# Patient Record
Sex: MALE | Race: WHITE | Employment: FULL TIME | URBAN - METROPOLITAN AREA
[De-identification: names, ages, dates, MRNs, and addresses within clinical notes are randomized per-mention and may not be internally consistent; named-entity substitution may affect disease eponyms.]

---

## 2021-02-11 ENCOUNTER — TELEPHONE (OUTPATIENT)
Dept: ADMINISTRATIVE | Age: 36
End: 2021-02-11

## 2021-02-11 NOTE — TELEPHONE ENCOUNTER
I will take the patient but I do not want to delay his first visit. Have him see Inna Musa and then I will take over the care. I just 1 to make sure with this weight loss that he see somebody as quickly as possible.

## 2021-02-11 NOTE — TELEPHONE ENCOUNTER
Pt informed. Pt was offered an appt with other providers.   He stated he has another name provided by Carlos Enrique Jenkins and will try that first.

## 2021-02-11 NOTE — TELEPHONE ENCOUNTER
Pt called requesting new pt appt stating Dr. Renay Carrero is referring him to you for 40 lb unintended weight loss over the past year, testicular pain, low lobido. Pt states Dr. Renay Carrero does not believe he has prostate cancer at this time but needs a PCP for additional work up. Please verify if you can accept this pt.

## 2021-02-11 NOTE — TELEPHONE ENCOUNTER
Pt called requesting a new pt appt and states Dr. Vaughn Arteaga is referring him to you. Over the past year, pt had (unexplained) 40lb weight loss, testicular pain, and low libido. There was concern about prostate cancer, but states Dr. Vaughn Arteaga does not believe that is the case right now. Pt was advised to schedule with primary care physician for additional care and follow up testing. Please advise if pt may schedule.     Thank you

## 2021-02-12 ENCOUNTER — OFFICE VISIT (OUTPATIENT)
Dept: PRIMARY CARE CLINIC | Age: 36
End: 2021-02-12
Payer: COMMERCIAL

## 2021-02-12 VITALS
HEART RATE: 82 BPM | OXYGEN SATURATION: 97 % | TEMPERATURE: 97.5 F | BODY MASS INDEX: 29.93 KG/M2 | WEIGHT: 221 LBS | HEIGHT: 72 IN | SYSTOLIC BLOOD PRESSURE: 118 MMHG | DIASTOLIC BLOOD PRESSURE: 72 MMHG | RESPIRATION RATE: 18 BRPM

## 2021-02-12 DIAGNOSIS — R35.89 OTHER POLYURIA: ICD-10-CM

## 2021-02-12 DIAGNOSIS — R63.4 WEIGHT LOSS: ICD-10-CM

## 2021-02-12 DIAGNOSIS — R73.09 ELEVATED GLUCOSE: ICD-10-CM

## 2021-02-12 DIAGNOSIS — E11.9 TYPE 2 DIABETES MELLITUS WITHOUT COMPLICATION, WITHOUT LONG-TERM CURRENT USE OF INSULIN (HCC): ICD-10-CM

## 2021-02-12 DIAGNOSIS — E11.9 TYPE 2 DIABETES MELLITUS WITHOUT COMPLICATION, WITHOUT LONG-TERM CURRENT USE OF INSULIN (HCC): Primary | ICD-10-CM

## 2021-02-12 LAB
ALBUMIN SERPL-MCNC: 4.9 G/DL (ref 3.5–5.2)
ALP BLD-CCNC: 92 U/L (ref 40–129)
ALT SERPL-CCNC: 38 U/L (ref 0–40)
ANION GAP SERPL CALCULATED.3IONS-SCNC: 15 MMOL/L (ref 7–16)
AST SERPL-CCNC: 20 U/L (ref 0–39)
BASOPHILS ABSOLUTE: 0.04 E9/L (ref 0–0.2)
BASOPHILS RELATIVE PERCENT: 0.5 % (ref 0–2)
BILIRUB SERPL-MCNC: 0.5 MG/DL (ref 0–1.2)
BILIRUBIN, POC: NORMAL
BLOOD URINE, POC: NORMAL
BUN BLDV-MCNC: 18 MG/DL (ref 6–20)
CALCIUM SERPL-MCNC: 10 MG/DL (ref 8.6–10.2)
CHLORIDE BLD-SCNC: 101 MMOL/L (ref 98–107)
CHOLESTEROL, TOTAL: 144 MG/DL (ref 0–199)
CHP ED QC CHECK: NORMAL
CLARITY, POC: CLEAR
CO2: 25 MMOL/L (ref 22–29)
COLOR, POC: YELLOW
CREAT SERPL-MCNC: 0.9 MG/DL (ref 0.7–1.2)
CREATININE URINE: 151 MG/DL (ref 40–278)
EOSINOPHILS ABSOLUTE: 0.18 E9/L (ref 0.05–0.5)
EOSINOPHILS RELATIVE PERCENT: 2.4 % (ref 0–6)
GFR AFRICAN AMERICAN: >60
GFR NON-AFRICAN AMERICAN: >60 ML/MIN/1.73
GLUCOSE BLD-MCNC: 197 MG/DL
GLUCOSE BLD-MCNC: 253 MG/DL (ref 74–99)
GLUCOSE URINE, POC: 500
HCT VFR BLD CALC: 51.8 % (ref 37–54)
HDLC SERPL-MCNC: 33 MG/DL
HEMOGLOBIN: 17.4 G/DL (ref 12.5–16.5)
IMMATURE GRANULOCYTES #: 0.02 E9/L
IMMATURE GRANULOCYTES %: 0.3 % (ref 0–5)
KETONES, POC: 15
LDL CHOLESTEROL CALCULATED: 66 MG/DL (ref 0–99)
LEUKOCYTE EST, POC: NORMAL
LYMPHOCYTES ABSOLUTE: 2.16 E9/L (ref 1.5–4)
LYMPHOCYTES RELATIVE PERCENT: 28.2 % (ref 20–42)
MCH RBC QN AUTO: 27.5 PG (ref 26–35)
MCHC RBC AUTO-ENTMCNC: 33.6 % (ref 32–34.5)
MCV RBC AUTO: 82 FL (ref 80–99.9)
MICROALBUMIN UR-MCNC: 84.9 MG/L
MICROALBUMIN/CREAT UR-RTO: 56.2 (ref 0–30)
MONOCYTES ABSOLUTE: 0.54 E9/L (ref 0.1–0.95)
MONOCYTES RELATIVE PERCENT: 7.1 % (ref 2–12)
NEUTROPHILS ABSOLUTE: 4.71 E9/L (ref 1.8–7.3)
NEUTROPHILS RELATIVE PERCENT: 61.5 % (ref 43–80)
NITRITE, POC: NORMAL
PDW BLD-RTO: 12.3 FL (ref 11.5–15)
PH, POC: 5
PLATELET # BLD: 189 E9/L (ref 130–450)
PMV BLD AUTO: 11.8 FL (ref 7–12)
POTASSIUM SERPL-SCNC: 4.1 MMOL/L (ref 3.5–5)
PROTEIN, POC: 30
RBC # BLD: 6.32 E12/L (ref 3.8–5.8)
SODIUM BLD-SCNC: 141 MMOL/L (ref 132–146)
SPECIFIC GRAVITY, POC: >=1.03
TOTAL PROTEIN: 7.8 G/DL (ref 6.4–8.3)
TRIGL SERPL-MCNC: 226 MG/DL (ref 0–149)
UROBILINOGEN, POC: 0.2
VLDLC SERPL CALC-MCNC: 45 MG/DL
WBC # BLD: 7.7 E9/L (ref 4.5–11.5)

## 2021-02-12 PROCEDURE — 81002 URINALYSIS NONAUTO W/O SCOPE: CPT | Performed by: NURSE PRACTITIONER

## 2021-02-12 PROCEDURE — 99204 OFFICE O/P NEW MOD 45 MIN: CPT | Performed by: NURSE PRACTITIONER

## 2021-02-12 PROCEDURE — 82962 GLUCOSE BLOOD TEST: CPT | Performed by: NURSE PRACTITIONER

## 2021-02-12 RX ORDER — GLUCOSAMINE HCL/CHONDROITIN SU 500-400 MG
CAPSULE ORAL
Qty: 100 STRIP | Refills: 2 | Status: SHIPPED
Start: 2021-02-12 | End: 2021-03-09

## 2021-02-12 RX ORDER — BLOOD-GLUCOSE METER
1 KIT MISCELLANEOUS DAILY
Qty: 1 KIT | Refills: 0 | Status: SHIPPED
Start: 2021-02-12 | End: 2021-03-09

## 2021-02-12 SDOH — ECONOMIC STABILITY: FOOD INSECURITY: WITHIN THE PAST 12 MONTHS, THE FOOD YOU BOUGHT JUST DIDN'T LAST AND YOU DIDN'T HAVE MONEY TO GET MORE.: NEVER TRUE

## 2021-02-12 SDOH — ECONOMIC STABILITY: TRANSPORTATION INSECURITY
IN THE PAST 12 MONTHS, HAS THE LACK OF TRANSPORTATION KEPT YOU FROM MEDICAL APPOINTMENTS OR FROM GETTING MEDICATIONS?: NOT ASKED

## 2021-02-12 SDOH — HEALTH STABILITY: MENTAL HEALTH: HOW MANY STANDARD DRINKS CONTAINING ALCOHOL DO YOU HAVE ON A TYPICAL DAY?: NOT ASKED

## 2021-02-12 SDOH — ECONOMIC STABILITY: INCOME INSECURITY: HOW HARD IS IT FOR YOU TO PAY FOR THE VERY BASICS LIKE FOOD, HOUSING, MEDICAL CARE, AND HEATING?: NOT HARD AT ALL

## 2021-02-12 SDOH — HEALTH STABILITY: MENTAL HEALTH: HOW OFTEN DO YOU HAVE A DRINK CONTAINING ALCOHOL?: MONTHLY OR LESS

## 2021-02-12 SDOH — ECONOMIC STABILITY: TRANSPORTATION INSECURITY
IN THE PAST 12 MONTHS, HAS LACK OF TRANSPORTATION KEPT YOU FROM MEETINGS, WORK, OR FROM GETTING THINGS NEEDED FOR DAILY LIVING?: NOT ASKED

## 2021-02-12 SDOH — ECONOMIC STABILITY: FOOD INSECURITY: WITHIN THE PAST 12 MONTHS, YOU WORRIED THAT YOUR FOOD WOULD RUN OUT BEFORE YOU GOT MONEY TO BUY MORE.: NEVER TRUE

## 2021-02-12 ASSESSMENT — PATIENT HEALTH QUESTIONNAIRE - PHQ9
1. LITTLE INTEREST OR PLEASURE IN DOING THINGS: 0
SUM OF ALL RESPONSES TO PHQ QUESTIONS 1-9: 0

## 2021-02-12 ASSESSMENT — ENCOUNTER SYMPTOMS
BACK PAIN: 1
DIARRHEA: 0
ALLERGIC/IMMUNOLOGIC NEGATIVE: 1
EYES NEGATIVE: 1
RESPIRATORY NEGATIVE: 1

## 2021-02-12 NOTE — TELEPHONE ENCOUNTER
Pt is scheduled for this afternoon at 2:30. I will call the office again to let them know I added him to Amanda's schedule.

## 2021-02-12 NOTE — PROGRESS NOTES
Subjective  CC: Patient presents establish. Previously saw Dr. Xin Burk in [facility], about 2 years ago. HPI: The patient is here to establish. For the last several months, he has been having urinary urgency, incomplete bladder emptying, dribbling, nocturia 4-5 times per night, some pressure when urinating, and loss of libido. Because of these issues, he saw Dr. Winter Kemp yesterday. The patient states that he did have an in office urine, and is going to have a testicular ultrasound as well as cystoscopy. During the course of their visit, the patient mentioned that he had lost about 40 pounds over the past year without any changes in his diet. The only change that he can verbalizes that his work has changed. 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 We reviewed the patient's past medical, family, and social history. Review of systems completed, which was positive for several signs of hyperglycemia. ROS:  Review of Systems   Constitutional: Positive for fatigue. Negative for unexpected weight change. HENT: Negative. Eyes: Negative. Respiratory: Negative. Cardiovascular: Negative. Gastrointestinal: Negative for diarrhea. IBS diarrhea - typically once every few weeks, most frequent the past 2 weeks   Endocrine: Positive for polydipsia and polyuria. Negative for polyphagia. Genitourinary: Positive for frequency and urgency. Musculoskeletal: Positive for back pain. Skin: Negative. Allergic/Immunologic: Negative. Neurological: Negative. Hematological: Negative. Psychiatric/Behavioral: Negative. Current Outpatient Medications:     blood glucose monitor strips, Test one times a day & as needed for symptoms of irregular blood glucose.  Dispense sufficient amount for indicated testing frequency plus additional to accommodate PRN testing needs. , Disp: 100 strip, Rfl: 2    glucose monitoring kit (FREESTYLE) monitoring kit, 1 kit by Does not apply route daily, Disp: 1 kit, Rfl: 0    metFORMIN (GLUCOPHAGE) 1000 MG tablet, Take 1 tablet by mouth 2 times daily (with meals), Disp: 60 tablet, Rfl: 5   Allergies   Allergen Reactions    Amoxapine And Related     Ceclor [Cefaclor]     Compazine [Prochlorperazine]     Penicillins     Septra [Sulfamethoxazole-Trimethoprim]     Sulfa Antibiotics         PMH:  Past Medical History:   Diagnosis Date    Irritable bowel syndrome with diarrhea         Objective  Vitals:    02/12/21 1429   BP: 118/72   Site: Left Upper Arm   Position: Sitting   Cuff Size: Medium Adult   Pulse: 82   Resp: 18   Temp: 97.5 °F (36.4 °C)   TempSrc: Temporal   SpO2: 97%   Weight: 221 lb (100.2 kg)   Height: 6' (1.829 m)      Exam:  Const: Appears healthy, well developed and well nourished. Appears overweight. Eyes: EOMI in both eyes. PERRL. ENMT: External ears WNL. Tympanic membranes are intact, right TM with some mild scarring. Septum is in the midline. Posterior  pharynx shows no exudate, irritation or redness. Neck: Supple and symmetric. Palpation reveals no adenopathy. No masses appreciated. Thyroid  exhibits no nodule or thyromegaly. No JVD. Resp: Respirations are unlabored. Respiration rate is normal. Auscultate good airflow. No rales,  rhonchi or wheezes appreciated over the lungs bilaterally. Abdomen: BS x 4 quadrants. Abdomen soft, round, nontender. No organomegaly noted. CV: Rhythm is regular. S1 is normal. S2 is normal.  Extremities: No clubbing or cyanosis. Musculo: Walks with a normal gait. Upper Extremities: Full ROM bilaterally. Lower Extremities: Full  ROM bilaterally. Skin: Dry and warm with no rash. Neuro: Alert and oriented x3. Mood is normal. Affect is normal. Speech is articulate and fluent. Wendy Ye was seen today for \[facility]\"" care.     Diagnoses and all orders for this visit:    Type 2 diabetes mellitus without complication, without long-term current use of insulin ([facility])  -     CBC Auto Differential; Future  -     Comprehensive Metabolic Panel; Future  -     Lipid Panel; Future  -     POCT Urinalysis no Micro  -     Microalbumin / Creatinine Urine Ratio; Future  -     blood glucose monitor strips; Test one times a day & as needed for symptoms of irregular blood glucose. Dispense sufficient amount for indicated testing frequency plus additional to accommodate PRN testing needs.  -     glucose monitoring kit (FREESTYLE) monitoring kit; 1 kit by Does not apply route daily  -     metFORMIN (GLUCOPHAGE) 1000 MG tablet; Take 1 tablet by mouth 2 times daily (with meals)    Other polyuria  -     POCT Glucose  -     POCT Urinalysis no Micro    Elevated glucose  -     Hemoglobin A1C; Future  -     Hemoglobin A1C    Weight loss  -     TSH without Reflex; Future       Care Plan:  The patient's point-of-care glucose was 197, hemoglobin A1c today was 11.8. The patient's never previously been diagnosed with diabetes, and is pharmacotherapy-naïve. Complete lab work will be done today, and I discussed with patient and his wife the implications of new diagnosis of diabetes. He denies any significant worsening of symptoms over the last 2 to 3 days. Complications of hyperglycemia were reviewed in detail, instructions for monitoring glucose were reviewed and diet was discussed in detail. The patient does drink a lot of sugary drinks, and diet is high in simple carbohydrates. Extensive time in teaching. The patient will keep a glucose log over the next several weeks, he will be started on Metformin, half a tab daily for 1 week and increase to a whole tab daily. I will see him back in a few weeks, I will need to complete an EKG at that time. I suspect that his symptoms will most likely all be related back to hyperglycemia, but he will continue to follow with urology as planned. To notify the office of any further questions.

## 2021-02-15 LAB — TSH SERPL DL<=0.05 MIU/L-ACNC: 4.74 UIU/ML (ref 0.27–4.2)

## 2021-02-16 DIAGNOSIS — R63.4 WEIGHT LOSS: ICD-10-CM

## 2021-02-16 RX ORDER — METFORMIN HYDROCHLORIDE 500 MG/1
500 TABLET, EXTENDED RELEASE ORAL 2 TIMES DAILY
Qty: 60 TABLET | Refills: 5 | Status: SHIPPED
Start: 2021-02-16 | End: 2021-03-09

## 2021-03-09 ENCOUNTER — TELEPHONE (OUTPATIENT)
Dept: PRIMARY CARE CLINIC | Age: 36
End: 2021-03-09

## 2021-03-09 ENCOUNTER — OFFICE VISIT (OUTPATIENT)
Dept: PRIMARY CARE CLINIC | Age: 36
End: 2021-03-09
Payer: COMMERCIAL

## 2021-03-09 VITALS
RESPIRATION RATE: 18 BRPM | OXYGEN SATURATION: 98 % | HEIGHT: 72 IN | DIASTOLIC BLOOD PRESSURE: 68 MMHG | BODY MASS INDEX: 29.39 KG/M2 | HEART RATE: 74 BPM | WEIGHT: 217 LBS | TEMPERATURE: 98.3 F | SYSTOLIC BLOOD PRESSURE: 108 MMHG

## 2021-03-09 DIAGNOSIS — E11.9 TYPE 2 DIABETES MELLITUS WITHOUT COMPLICATION, WITHOUT LONG-TERM CURRENT USE OF INSULIN (HCC): Primary | ICD-10-CM

## 2021-03-09 DIAGNOSIS — R94.6 ABNORMAL THYROID FUNCTION TEST: ICD-10-CM

## 2021-03-09 DIAGNOSIS — R79.89 ABNORMAL CBC: ICD-10-CM

## 2021-03-09 LAB
BASOPHILS ABSOLUTE: 0.04 E9/L (ref 0–0.2)
BASOPHILS RELATIVE PERCENT: 0.6 % (ref 0–2)
EOSINOPHILS ABSOLUTE: 0.15 E9/L (ref 0.05–0.5)
EOSINOPHILS RELATIVE PERCENT: 2.1 % (ref 0–6)
HCT VFR BLD CALC: 50.2 % (ref 37–54)
HEMOGLOBIN: 16.2 G/DL (ref 12.5–16.5)
IMMATURE GRANULOCYTES #: 0.03 E9/L
IMMATURE GRANULOCYTES %: 0.4 % (ref 0–5)
LYMPHOCYTES ABSOLUTE: 1.89 E9/L (ref 1.5–4)
LYMPHOCYTES RELATIVE PERCENT: 27.1 % (ref 20–42)
MCH RBC QN AUTO: 28 PG (ref 26–35)
MCHC RBC AUTO-ENTMCNC: 32.3 % (ref 32–34.5)
MCV RBC AUTO: 86.9 FL (ref 80–99.9)
MONOCYTES ABSOLUTE: 0.48 E9/L (ref 0.1–0.95)
MONOCYTES RELATIVE PERCENT: 6.9 % (ref 2–12)
NEUTROPHILS ABSOLUTE: 4.39 E9/L (ref 1.8–7.3)
NEUTROPHILS RELATIVE PERCENT: 62.9 % (ref 43–80)
PDW BLD-RTO: 12.8 FL (ref 11.5–15)
PLATELET # BLD: 178 E9/L (ref 130–450)
PMV BLD AUTO: 12.3 FL (ref 7–12)
RBC # BLD: 5.78 E12/L (ref 3.8–5.8)
T4 FREE: 1.24 NG/DL (ref 0.93–1.7)
TSH SERPL DL<=0.05 MIU/L-ACNC: 2.03 UIU/ML (ref 0.27–4.2)
WBC # BLD: 7 E9/L (ref 4.5–11.5)

## 2021-03-09 PROCEDURE — 99213 OFFICE O/P EST LOW 20 MIN: CPT | Performed by: NURSE PRACTITIONER

## 2021-03-09 PROCEDURE — 93000 ELECTROCARDIOGRAM COMPLETE: CPT | Performed by: NURSE PRACTITIONER

## 2021-03-09 RX ORDER — LANCETS 28 GAUGE
EACH MISCELLANEOUS
COMMUNITY
Start: 2021-02-13

## 2021-03-09 RX ORDER — METFORMIN HYDROCHLORIDE 500 MG/1
500 TABLET, EXTENDED RELEASE ORAL DAILY
Qty: 60 TABLET | Refills: 5
Start: 2021-03-09 | End: 2021-08-27 | Stop reason: SDUPTHER

## 2021-03-09 ASSESSMENT — ENCOUNTER SYMPTOMS
RESPIRATORY NEGATIVE: 1
DIARRHEA: 0
EYES NEGATIVE: 1
ALLERGIC/IMMUNOLOGIC NEGATIVE: 1
BACK PAIN: 1

## 2021-03-09 NOTE — PROGRESS NOTES
Subjective  CC: Patient presents to follow-up. HPI: The patient established last month with complaints of weight loss over the previous 6 months. He was found to have type 2 diabetes, hemoglobin A1c was 11.8. The patient was initially started on regular release Metformin but could not tolerate this so he is now taking extended release Metformin once daily with good tolerance. His fasting glucoses are significantly improved over the last month. These were in the 200 range, and he is now fairly consistently between 110 and 130. He has made significant dietary changes. 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 He plans to work on improving this and will let us know if medication may be helpful. I reviewed lab work extensively that was completed at his last office visit, he does have moderate microalbuminuria, this will need to be monitored and perhaps an antihypertensive started for renal protection if it does not improve, but anticipated improving based on the way his fasting glucoses look. His red blood cell count and hemoglobin are also slightly elevated, the patient states he does have a history of sleep apnea but has not needed to use the CPAP for several months since he has lost weight and has not had any snoring or apneic episodes. This will be rechecked along with free T4 and TSH which are slightly elevated. The patient states polyuria and polydipsia have nearly completely resolved. He does continue to follow with urology and is planning to have cystoscopy due to persistent urinary symptoms. ROS:  Review of Systems   Constitutional: Positive for fatigue. Negative for unexpected weight change.    HENT: Negative. Eyes: Negative. Respiratory: Negative. Cardiovascular: Negative. Gastrointestinal: Negative for diarrhea. IBS diarrhea - typically once every few weeks, most frequent the past 2 weeks   Endocrine: Negative for polydipsia, polyphagia and polyuria. Genitourinary: Negative for frequency and urgency. Musculoskeletal: Positive for back pain. Skin: Negative. Allergic/Immunologic: Negative. Neurological: Negative. Hematological: Negative. Psychiatric/Behavioral: Negative. Current Outpatient Medications:     metFORMIN (GLUCOPHAGE-XR) 500 MG extended release tablet, Take 1 tablet by mouth daily, Disp: 60 tablet, Rfl: 5    FreeStyle Lancets MISC, , Disp: , Rfl:    Allergies   Allergen Reactions    Amoxapine And Related     Ceclor [Cefaclor]     Compazine [Prochlorperazine]     Penicillins     Septra [Sulfamethoxazole-Trimethoprim]     Sulfa Antibiotics         PMH:  Past Medical History:   Diagnosis Date    Irritable bowel syndrome with diarrhea     Obstructive sleep apnea         Objective  Vitals:    03/09/21 1003   BP: 108/68   Site: Left Upper Arm   Position: Sitting   Cuff Size: Medium Adult   Pulse: 74   Resp: 18   Temp: 98.3 °F (36.8 °C)   TempSrc: Temporal   SpO2: 98%   Weight: 217 lb (98.4 kg)   Height: 6' (1.829 m)      Exam:  Const: Appears healthy, well developed and well nourished. Appears overweight. Neck: Supple and symmetric. Palpation reveals no adenopathy. No masses appreciated. Thyroid  exhibits no nodule or thyromegaly. No JVD. Resp: Respirations are unlabored. Respiration rate is normal. Auscultate good airflow. No rales,  rhonchi or wheezes appreciated over the lungs bilaterally. CV: Rhythm is regular. S1 is normal. S2 is normal.  Extremities: No clubbing or cyanosis. Musculo: Walks with a normal gait. Upper Extremities: Full ROM bilaterally. Lower Extremities: Full  ROM bilaterally. Skin: Dry and warm with no rash.   Neuro: Alert and oriented x3. Mood is normal. Affect is normal. Speech is articulate and fluent. Umberto Gary was seen today for diabetes and follow-up. Diagnoses and all orders for this visit:    Type 2 diabetes mellitus without complication, without long-term current use of insulin ([facility])  -     EKG 12 Lead    Abnormal thyroid function test  -     TSH without Reflex; Future  -     T4, Free; Future    Abnormal CBC  -     CBC Auto Differential; Future  -     EKG 12 Lead    Other orders  -     metFORMIN (GLUCOPHAGE-XR) 500 MG extended release tablet; Take 1 tablet by mouth daily       Care Plan:  Extensive education on swelling in regards to diabetic management. The patient will continue current therapy, continue to make medication changes, and will be seen back in 2 to 3 months for repeat hemoglobin A1c and a recheck of urine microalbumin. I have asked them to continue to monitor weight loss, would anticipate this to plateau in the near future. Notify us of any problems in the interim.

## 2021-05-13 ENCOUNTER — OFFICE VISIT (OUTPATIENT)
Dept: PRIMARY CARE CLINIC | Age: 36
End: 2021-05-13
Payer: COMMERCIAL

## 2021-05-13 VITALS
SYSTOLIC BLOOD PRESSURE: 112 MMHG | HEIGHT: 72 IN | DIASTOLIC BLOOD PRESSURE: 76 MMHG | BODY MASS INDEX: 29.66 KG/M2 | TEMPERATURE: 97 F | OXYGEN SATURATION: 97 % | WEIGHT: 219 LBS | HEART RATE: 72 BPM

## 2021-05-13 DIAGNOSIS — E11.9 TYPE 2 DIABETES MELLITUS WITHOUT COMPLICATION, WITHOUT LONG-TERM CURRENT USE OF INSULIN (HCC): Primary | ICD-10-CM

## 2021-05-13 DIAGNOSIS — E11.9 TYPE 2 DIABETES MELLITUS WITHOUT COMPLICATION, WITHOUT LONG-TERM CURRENT USE OF INSULIN (HCC): ICD-10-CM

## 2021-05-13 LAB
ANION GAP SERPL CALCULATED.3IONS-SCNC: 14 MMOL/L (ref 7–16)
BUN BLDV-MCNC: 14 MG/DL (ref 6–20)
CALCIUM SERPL-MCNC: 9.5 MG/DL (ref 8.6–10.2)
CHLORIDE BLD-SCNC: 99 MMOL/L (ref 98–107)
CO2: 23 MMOL/L (ref 22–29)
CREAT SERPL-MCNC: 0.9 MG/DL (ref 0.7–1.2)
CREATININE URINE: 117 MG/DL (ref 40–278)
GFR AFRICAN AMERICAN: >60
GFR NON-AFRICAN AMERICAN: >60 ML/MIN/1.73
GLUCOSE BLD-MCNC: 198 MG/DL (ref 74–99)
HBA1C MFR BLD: 7.3 %
MICROALBUMIN UR-MCNC: 15.2 MG/L
MICROALBUMIN/CREAT UR-RTO: 13 (ref 0–30)
POTASSIUM SERPL-SCNC: 4 MMOL/L (ref 3.5–5)
SODIUM BLD-SCNC: 136 MMOL/L (ref 132–146)

## 2021-05-13 PROCEDURE — 83036 HEMOGLOBIN GLYCOSYLATED A1C: CPT | Performed by: INTERNAL MEDICINE

## 2021-05-13 PROCEDURE — 3051F HG A1C>EQUAL 7.0%<8.0%: CPT | Performed by: INTERNAL MEDICINE

## 2021-05-13 PROCEDURE — 99214 OFFICE O/P EST MOD 30 MIN: CPT | Performed by: INTERNAL MEDICINE

## 2021-05-13 ASSESSMENT — ENCOUNTER SYMPTOMS
DIARRHEA: 0
BACK PAIN: 1
ALLERGIC/IMMUNOLOGIC NEGATIVE: 1
EYES NEGATIVE: 1
RESPIRATORY NEGATIVE: 1

## 2021-05-13 NOTE — PROGRESS NOTES
Subjective  CC: Patient presents to follow-up. HPI: This a patient who is newly diagnosed with diabetes. His initial hemoglobin A1c back in February was 11.8. He has been doing very well in terms of dietary restriction and keeping his blood sugars. The patient has not had a dilated eye exam but I told him I would wait at least 6 months while his blood sugars are well controlled before getting an examination. I told him that his lines would be dilating and this will need to be adjusted first in terms of blood sugar control. Patient seems to be doing well with the medication. I believe he had high hopes that I would tell him he could go off the medication but this is not the case. I did explain the process of microalbuminuria. We also discussed macro and microvascular events related to diabetes. ROS:  Review of Systems   Constitutional: Negative for fatigue and unexpected weight change. HENT: Negative. Eyes: Negative. Respiratory: Negative. Cardiovascular: Negative. Gastrointestinal: Negative for diarrhea. IBS diarrhea - typically once every few weeks, most frequent the past 2 weeks   Endocrine: Negative for polydipsia, polyphagia and polyuria. Genitourinary: Negative for frequency and urgency. Musculoskeletal: Positive for back pain. Skin: Negative. Allergic/Immunologic: Negative. Neurological: Negative. Hematological: Negative. Psychiatric/Behavioral: Negative. Negative for self-injury.           Current Outpatient Medications:     FreeStyle Lancets MISC, , Disp: , Rfl:     metFORMIN (GLUCOPHAGE-XR) 500 MG extended release tablet, Take 1 tablet by mouth daily, Disp: 60 tablet, Rfl: 5   Allergies   Allergen Reactions    Amoxapine And Related     Ceclor [Cefaclor]     Compazine [Prochlorperazine]     Penicillins     Septra [Sulfamethoxazole-Trimethoprim]     Sulfa Antibiotics         PMH:  Past Medical History:   Diagnosis Date    Irritable bowel syndrome with diarrhea     Obstructive sleep apnea         Objective  Vitals:    05/13/21 1408   BP: 112/76   Pulse: 72   Temp: 97 °F (36.1 °C)   SpO2: 97%   Weight: 219 lb (99.3 kg)   Height: 6' (1.829 m)      Exam:  Const: Appears healthy, well developed and well nourished. Appears overweight. Neck: Supple and symmetric. Palpation reveals no adenopathy. No masses appreciated. Thyroid  exhibits no nodule or thyromegaly. No JVD. Resp: Respirations are unlabored. Respiration rate is normal. Auscultate good airflow. No rales,  rhonchi or wheezes appreciated over the lungs bilaterally. CV: Rhythm is regular. S1 is normal. S2 is normal.  Extremities: No clubbing or cyanosis. Musculo: Walks with a normal gait. Upper Extremities: Full ROM bilaterally. Lower Extremities: Full  ROM bilaterally. Skin: Dry and warm with no rash. Neuro: Alert and oriented x3. Mood is normal. Affect is normal. Speech is articulate and fluent. Vibratory sense of the upper and lower extremities is intact. Alisha Smith was seen today for establish care. Diagnoses and all orders for this visit:    Type 2 diabetes mellitus without complication, without long-term current use of insulin ([facility])  -     POCT glycosylated hemoglobin (Hb A1C)  -     Microalbumin / Creatinine Urine Ratio; Future  -     Basic Metabolic Panel; Future  -     MISCELLANEOUS SENDOUT 1; Future    The patient will have a repeat urine for microalbumin today. If it is still elevated then I would seriously consider ACE intubation. I explained to him the mechanism of this process. Patient is to delay ophthalmologic examination for at least 3 more months. Repeat labs in 4 months. Possible use of ACE inhibition if microalbumin continues.

## 2021-05-17 LAB — C-PEPTIDE: 5.7 NG/ML (ref 0.8–3.5)

## 2021-08-27 RX ORDER — METFORMIN HYDROCHLORIDE 500 MG/1
500 TABLET, EXTENDED RELEASE ORAL DAILY
Qty: 60 TABLET | Refills: 5 | Status: SHIPPED | OUTPATIENT
Start: 2021-08-27

## 2021-08-27 NOTE — TELEPHONE ENCOUNTER
----- Message from Micaela Octavio sent at 8/27/2021  9:36 AM EDT -----  Subject: Refill Request    QUESTIONS  Name of Medication? metFORMIN (GLUCOPHAGE-XR) 500 MG extended release   tablet  Patient-reported dosage and instructions? 500mg daily  How many days do you have left? 10  Preferred Pharmacy? [facility].  Pharmacy phone number (if available)? [phone]  Additional Information for Provider? Switching pharmacy locations to one   closer to home.  ---------------------------------------------------------------------------  --------------  [address]  What is the best way for the office to contact you? OK to leave message on   voicemail  Preferred Call Back Phone Number?  [phone]

## 2023-06-22 ENCOUNTER — HOSPITAL ENCOUNTER (OUTPATIENT)
Dept: HOSPITAL 83 - US | Age: 38
Discharge: HOME | End: 2023-06-22
Attending: NURSE PRACTITIONER
Payer: COMMERCIAL

## 2023-06-22 DIAGNOSIS — R19.00: ICD-10-CM

## 2023-06-22 DIAGNOSIS — Z13.29: Primary | ICD-10-CM

## 2023-06-22 DIAGNOSIS — E11.9: ICD-10-CM

## 2023-06-22 LAB
ALP SERPL-CCNC: 57 U/L (ref 46–116)
ALT SERPL W P-5'-P-CCNC: 31 U/L (ref 10–49)
BASOPHILS # BLD AUTO: 0 10*3/UL (ref 0–0.1)
BASOPHILS NFR BLD AUTO: 0.4 % (ref 0–1)
BUN SERPL-MCNC: 9 MG/DL (ref 9–23)
CHLORIDE SERPL-SCNC: 107 MMOL/L (ref 98–107)
CREAT UR-MCNC: 229.96 MG/DL
EOSINOPHIL # BLD AUTO: 0.1 10*3/UL (ref 0–0.4)
EOSINOPHIL # BLD AUTO: 0.7 % (ref 1–4)
ERYTHROCYTE [DISTWIDTH] IN BLOOD BY AUTOMATED COUNT: 12.8 % (ref 0–14.5)
HCT VFR BLD AUTO: 45.3 % (ref 42–52)
LYMPHOCYTES # BLD AUTO: 1.9 10*3/UL (ref 1.3–4.4)
LYMPHOCYTES NFR BLD AUTO: 26.1 % (ref 27–41)
MCH RBC QN AUTO: 29.1 PG (ref 27–31)
MCHC RBC AUTO-ENTMCNC: 34.9 G/DL (ref 33–37)
MCV RBC AUTO: 83.4 FL (ref 80–94)
MONOCYTES # BLD AUTO: 0.4 10*3/UL (ref 0.1–1)
MONOCYTES NFR BLD MANUAL: 5.7 % (ref 3–9)
NEUT #: 5 10*3/UL (ref 2.3–7.9)
NEUT %: 66.7 % (ref 47–73)
NRBC BLD QL AUTO: 0 10*3/UL (ref 0–0)
PLATELET # BLD AUTO: 181 10*3/UL (ref 130–400)
PMV BLD AUTO: 11 FL (ref 9.6–12.3)
POTASSIUM SERPL-SCNC: 3.5 MMOL/L (ref 3.4–5.1)
PROT SERPL-MCNC: 7.3 GM/DL (ref 6–8)
RBC # BLD AUTO: 5.43 10*6/UL (ref 4.5–5.9)
WBC NRBC COR # BLD AUTO: 7.4 10*3/UL (ref 4.8–10.8)

## 2025-02-16 ENCOUNTER — HOSPITAL ENCOUNTER (EMERGENCY)
Dept: HOSPITAL 83 - ED | Age: 40
Discharge: HOME | End: 2025-02-16
Payer: COMMERCIAL

## 2025-02-16 VITALS — HEIGHT: 71.97 IN | BODY MASS INDEX: 27.77 KG/M2 | WEIGHT: 205 LBS

## 2025-02-16 VITALS — DIASTOLIC BLOOD PRESSURE: 87 MMHG
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
## 2025-02-16 DIAGNOSIS — Z91.030: ICD-10-CM

## 2025-02-16 DIAGNOSIS — Z91.040: ICD-10-CM

## 2025-02-16 DIAGNOSIS — Z88.8: ICD-10-CM
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
## 2025-02-16 DIAGNOSIS — Z88.1: ICD-10-CM

## 2025-02-16 DIAGNOSIS — Z88.0: ICD-10-CM
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
## 2025-02-16 DIAGNOSIS — Z98.890: ICD-10-CM

## 2025-02-16 DIAGNOSIS — Z88.2: ICD-10-CM

## 2025-02-16 DIAGNOSIS — S82.54XA: Primary | ICD-10-CM

## 2025-02-17 ENCOUNTER — HOSPITAL ENCOUNTER (OUTPATIENT)
Dept: HOSPITAL 83 - RAD | Age: 40
Discharge: HOME | End: 2025-02-17
Attending: ORTHOPAEDIC SURGERY
Payer: COMMERCIAL
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
## 2025-02-17 DIAGNOSIS — S82.831A: Primary | ICD-10-CM

## 2025-02-17 LAB
BUN SERPL-MCNC: 11 MG/DL (ref 9–23)
CHLORIDE SERPL-SCNC: 103 MMOL/L (ref 98–107)
POTASSIUM SERPL-SCNC: 4 MMOL/L (ref 3.4–5.1)

## 2025-02-18 ENCOUNTER — HOSPITAL ENCOUNTER (OUTPATIENT)
Dept: HOSPITAL 83 - SDC | Age: 40
Discharge: HOME | End: 2025-02-18
Attending: ORTHOPAEDIC SURGERY
Payer: COMMERCIAL

## 2025-02-18 VITALS — SYSTOLIC BLOOD PRESSURE: 130 MMHG | DIASTOLIC BLOOD PRESSURE: 83 MMHG

## 2025-02-18 VITALS — DIASTOLIC BLOOD PRESSURE: 73 MMHG | SYSTOLIC BLOOD PRESSURE: 112 MMHG

## 2025-02-18 VITALS — SYSTOLIC BLOOD PRESSURE: 110 MMHG | DIASTOLIC BLOOD PRESSURE: 73 MMHG

## 2025-02-18 VITALS — BODY MASS INDEX: 27.77 KG/M2 | WEIGHT: 205 LBS | HEIGHT: 71.97 IN

## 2025-02-18 VITALS — DIASTOLIC BLOOD PRESSURE: 88 MMHG

## 2025-02-18 VITALS — SYSTOLIC BLOOD PRESSURE: 129 MMHG | DIASTOLIC BLOOD PRESSURE: 84 MMHG

## 2025-02-18 VITALS — DIASTOLIC BLOOD PRESSURE: 82 MMHG

## 2025-02-18 VITALS — DIASTOLIC BLOOD PRESSURE: 75 MMHG | SYSTOLIC BLOOD PRESSURE: 120 MMHG

## 2025-02-18 DIAGNOSIS — F10.90: ICD-10-CM

## 2025-02-18 DIAGNOSIS — E11.9: ICD-10-CM

## 2025-02-18 DIAGNOSIS — Z98.890: ICD-10-CM

## 2025-02-18 DIAGNOSIS — S82.839A: ICD-10-CM
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
## 2025-02-18 DIAGNOSIS — G89.18: ICD-10-CM

## 2025-02-18 DIAGNOSIS — I10: ICD-10-CM
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
## 2025-02-18 DIAGNOSIS — S82.841A: Primary | ICD-10-CM

## 2025-02-18 DIAGNOSIS — F41.9: ICD-10-CM

## 2025-02-18 DIAGNOSIS — Z88.0: ICD-10-CM

## 2025-02-18 DIAGNOSIS — S82.861A: ICD-10-CM

## 2025-02-18 DIAGNOSIS — Z88.1: ICD-10-CM

## 2025-02-18 DIAGNOSIS — S93.431A: ICD-10-CM
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
## 2025-02-18 DIAGNOSIS — Z88.2: ICD-10-CM

## 2025-02-18 DIAGNOSIS — Z98.52: ICD-10-CM

## 2025-02-18 DIAGNOSIS — S82.51XA: ICD-10-CM

## 2025-02-18 DIAGNOSIS — Z88.8: ICD-10-CM
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
## 2025-02-28 ENCOUNTER — HOSPITAL ENCOUNTER (OUTPATIENT)
Dept: HOSPITAL 83 - ORTHO | Age: 40
Discharge: HOME | End: 2025-02-28
Attending: ORTHOPAEDIC SURGERY
Payer: COMMERCIAL
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
## 2025-02-28 DIAGNOSIS — S82.54XD: Primary | ICD-10-CM

## 2025-02-28 DIAGNOSIS — S82.864D: ICD-10-CM

## 2025-02-28 DIAGNOSIS — S93.431D: ICD-10-CM

## 2025-03-28 ENCOUNTER — HOSPITAL ENCOUNTER (OUTPATIENT)
Dept: HOSPITAL 83 - ORTHO | Age: 40
Discharge: HOME | End: 2025-03-28
Attending: ORTHOPAEDIC SURGERY
Payer: COMMERCIAL

## 2025-03-28 DIAGNOSIS — S93.431D: ICD-10-CM

## 2025-03-28 DIAGNOSIS — S82.54XD: Primary | ICD-10-CM

## 2025-03-28 DIAGNOSIS — S82.864D: ICD-10-CM
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
## 2025-05-09 ENCOUNTER — HOSPITAL ENCOUNTER (OUTPATIENT)
Dept: HOSPITAL 83 - ORTHO | Age: 40
Discharge: HOME | End: 2025-05-09
Attending: ORTHOPAEDIC SURGERY
Payer: COMMERCIAL
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
## 2025-05-09 DIAGNOSIS — S93.431D: ICD-10-CM

## 2025-05-09 DIAGNOSIS — S82.54XD: Primary | ICD-10-CM

## 2025-05-09 DIAGNOSIS — S82.864D: ICD-10-CM
